# Patient Record
Sex: MALE | Race: OTHER | NOT HISPANIC OR LATINO | URBAN - METROPOLITAN AREA
[De-identification: names, ages, dates, MRNs, and addresses within clinical notes are randomized per-mention and may not be internally consistent; named-entity substitution may affect disease eponyms.]

---

## 2022-10-20 ENCOUNTER — EMERGENCY (EMERGENCY)
Facility: HOSPITAL | Age: 47
LOS: 1 days | Discharge: HOME | End: 2022-10-20
Admitting: EMERGENCY MEDICINE

## 2022-10-20 VITALS
WEIGHT: 141.98 LBS | TEMPERATURE: 98 F | RESPIRATION RATE: 16 BRPM | SYSTOLIC BLOOD PRESSURE: 138 MMHG | HEART RATE: 78 BPM | DIASTOLIC BLOOD PRESSURE: 88 MMHG | OXYGEN SATURATION: 98 %
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
## 2022-10-20 DIAGNOSIS — S61.012A LACERATION WITHOUT FOREIGN BODY OF LEFT THUMB WITHOUT DAMAGE TO NAIL, INITIAL ENCOUNTER: ICD-10-CM
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
## 2022-10-20 PROCEDURE — 99053 MED SERV 10PM-8AM 24 HR FAC: CPT

## 2022-10-20 PROCEDURE — 12001 RPR S/N/AX/GEN/TRNK 2.5CM/<: CPT

## 2022-10-20 PROCEDURE — 99283 EMERGENCY DEPT VISIT LOW MDM: CPT | Mod: 25

## 2022-10-20 NOTE — ED ADULT TRIAGE NOTE - CHIEF COMPLAINT QUOTE
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 +1cm laceration, bleeding controlled with pressure. last tetanus ~2 years ago

## 2022-10-20 NOTE — ED PROVIDER NOTE - PHYSICAL EXAMINATION
Physical Exam    Vital Signs: I have reviewed the initial vital signs.  Constitutional: well-nourished, appears stated age, no acute distress  Cardiovascular: regular rate, regular rhythm, well-perfused extremities  Musculoskeletal: full ROM in all joints b/l, +L thumb laceration superficial 0.5 cm  Integumentary: warm, dry, no rash  Neurologic: UE extremities’ motor and sensory functions grossly intact

## 2022-10-20 NOTE — ED PROVIDER NOTE - CLINICAL SUMMARY MEDICAL DECISION MAKING FREE TEXT BOX
well appearing pt pw L thumb laceration and bleeding is controlled with pressure, no weakness or numbness. Plan: dermabond

## 2022-10-20 NOTE — ED PROVIDER NOTE - NS ED ROS FT
Review of Systems    Constitutional: (-) fever  Cardiovascular: (-) chest pain, (-) palpitation   Musculoskeletal: (-) neck pain, (-) back pain, (-) joint pain  Integumentary: (-) rash, (-) edema  Neurological: (-) headache, (-) altered mental status  Heme/Lymph: (-) easy bruising (-) easy bleeding

## 2022-10-20 NOTE — ED PROVIDER NOTE - PATIENT PORTAL LINK FT
You can access the FollowMyHealth Patient Portal offered by [facility] by registering at the following website: [email or web address]. By joining [employer]’s FollowMyHealth portal, you will also be able to view your health information using other applications (apps) compatible with our system.

## 2022-10-20 NOTE — ED PROVIDER NOTE - OBJECTIVE STATEMENT
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 tDAP 2020.    I have reviewed available current nursing and previous documentation of past medical, surgical, family, and/or social history.

## 2022-10-20 NOTE — ED ADULT NURSE NOTE - OBJECTIVE STATEMENT
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 Laceration noted to L thumb, mild redness noted to site.

## 2025-06-23 NOTE — ED ADULT NURSE NOTE - CHIEF COMPLAINT QUOTE
alert and awake
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 +1cm laceration, bleeding controlled with pressure. last tetanus ~2 years ago